# Patient Record
Sex: MALE | Race: BLACK OR AFRICAN AMERICAN | NOT HISPANIC OR LATINO | Employment: OTHER | URBAN - METROPOLITAN AREA
[De-identification: names, ages, dates, MRNs, and addresses within clinical notes are randomized per-mention and may not be internally consistent; named-entity substitution may affect disease eponyms.]

---

## 2017-05-31 ENCOUNTER — TELEPHONE (OUTPATIENT)
Dept: ORTHOPEDIC SURGERY | Facility: CLINIC | Age: 62
End: 2017-05-31

## 2017-06-01 NOTE — TELEPHONE ENCOUNTER
I can't make any guarantees about this.  I would be happy to see him in the next available I OV and could take further history with his x-rays and try and make that determination.

## 2017-06-20 ENCOUNTER — OFFICE VISIT (OUTPATIENT)
Dept: ORTHOPEDIC SURGERY | Facility: CLINIC | Age: 62
End: 2017-06-20

## 2017-06-20 VITALS — BODY MASS INDEX: 41.15 KG/M2 | WEIGHT: 262.2 LBS | HEIGHT: 67 IN | TEMPERATURE: 98.1 F

## 2017-06-20 DIAGNOSIS — Z96.653 H/O TOTAL KNEE REPLACEMENT, BILATERAL: ICD-10-CM

## 2017-06-20 DIAGNOSIS — Z96.653 HISTORY OF BILATERAL KNEE ARTHROPLASTY: Primary | ICD-10-CM

## 2017-06-20 PROCEDURE — 73562 X-RAY EXAM OF KNEE 3: CPT | Performed by: ORTHOPAEDIC SURGERY

## 2017-06-20 PROCEDURE — 99212 OFFICE O/P EST SF 10 MIN: CPT | Performed by: ORTHOPAEDIC SURGERY

## 2017-06-20 RX ORDER — ERGOCALCIFEROL 1.25 MG/1
50000 CAPSULE ORAL
COMMUNITY

## 2017-06-20 RX ORDER — INSULIN GLARGINE 100 [IU]/ML
85 INJECTION, SOLUTION SUBCUTANEOUS EVERY MORNING
COMMUNITY

## 2017-06-20 NOTE — PROGRESS NOTES
"Patient: Harsh Arias Jr.  YOB: 1955 61 y.o. male  Medical Record Number: [id number]    Chief Complaints:   Chief Complaint   Patient presents with   • Right Knee - Follow-up   • Left Knee - Follow-up       History of Present Illness:Harsh Arias Jr. is a 61 y.o. male who presents for follow-up of  Bilateral knees.  I replaced his left knee in 2014 and he's had the right [facility].  Overall her knees are satisfactory although has a little more discomfort in the right knee than in the left.  Unfortunately his weight his gone up a little bit more recently.    Allergies: No Known Allergies    Medications:   Current Outpatient Prescriptions   Medication Sig Dispense Refill   • Canagliflozin 100 MG tablet Take 100 mg by mouth.     • insulin glargine (LANTUS) 100 UNIT/ML injection Inject  under the skin.     • vitamin D (ERGOCALCIFEROL) 89854 UNITS capsule capsule Take 50,000 Units by mouth.       No current facility-administered medications for this visit.          The following portions of the patient's history were reviewed and updated as appropriate: allergies, current medications, past family history, past medical history, past social history, past surgical history and problem list.    Review of Systems:   A 14 point review of systems was performed. All systems negative except pertinent positives/negative listed in HPI above    Physical Exam:   Vitals:    06/20/17 1450   Temp: 98.1 °F (36.7 °C)   Weight: 262 lb 3.2 oz (119 kg)   Height: 67\" (170.2 cm)       General: A and O x 3, ASA, NAD    SCLERA:    Normal    DENTITION:   Normal  Both knees show well-healed incisions range of motion is roughly 0-120 bilaterally.  There is no effusion the patella tracks midline there's no varus valgus or flexion instability.    Radiology:  Xrays 3views of knees (ap,lateral, sunrise) were ordered and reviewed for evaluation of knee pain demonstrating well positioned knee " replacements without evidence of wear, loosening or osteolysis  todays xrays were compared to previous xrays and demonstrate no change    Assessment/Plan:  Bilateral total knee replacements both of them mechanically lock and field fine.  The x-rays show no issues.  He had a question about whether his right knee with service connected.  Clearly he had an left knee injury which may apply stress in the right knee but whether or not this is a service-connected diagnosis I would defer to [facility] who could review previous records if possible to determine if there any pre-existing right knee issues.

## 2018-01-24 ENCOUNTER — OFFICE VISIT (OUTPATIENT)
Dept: BARIATRICS/WEIGHT MGMT | Facility: CLINIC | Age: 63
End: 2018-01-24

## 2018-01-24 VITALS
TEMPERATURE: 98.7 F | SYSTOLIC BLOOD PRESSURE: 150 MMHG | RESPIRATION RATE: 18 BRPM | HEART RATE: 106 BPM | WEIGHT: 269 LBS | BODY MASS INDEX: 42.22 KG/M2 | HEIGHT: 67 IN | DIASTOLIC BLOOD PRESSURE: 94 MMHG

## 2018-01-24 DIAGNOSIS — R13.19 OTHER DYSPHAGIA: Primary | ICD-10-CM

## 2018-01-24 DIAGNOSIS — IMO0001 IDDM (INSULIN DEPENDENT DIABETES MELLITUS): ICD-10-CM

## 2018-01-24 DIAGNOSIS — K21.9 GASTROESOPHAGEAL REFLUX DISEASE, ESOPHAGITIS PRESENCE NOT SPECIFIED: ICD-10-CM

## 2018-01-24 DIAGNOSIS — R10.11 RUQ ABDOMINAL PAIN: ICD-10-CM

## 2018-01-24 DIAGNOSIS — Z98.84 HISTORY OF LAPAROSCOPIC ADJUSTABLE GASTRIC BANDING: ICD-10-CM

## 2018-01-24 PROCEDURE — 99204 OFFICE O/P NEW MOD 45 MIN: CPT | Performed by: SURGERY

## 2018-01-24 NOTE — PROGRESS NOTES
[facility] SURGERY  [address]  [place] [id number] [phone]  [address]  [place] [address] [phone]  Dept: [phone]  1/24/2018      Harsh Arias Jr..  [id number]  [id number]  1955  male      Chief Complaint   Patient presents with   • Consult     JOSY band - AGB 2014 with Dr. Castellon        Post-Op Bariatric Surgery:   Harsh Arias Jr. is status post laparopscopic Band procedure, performed on 2014 at [facility].    HPI:   Today's weight is 122 kg (269 lb) pounds, today's BMI is Body mass index is 42.12 kg/(m^2)..  his greatest weight loss from surgery was 70 pounds. The patient reports an unwanted weight gain of 69 pounds.  [unfilled] denies fever, chills, chest pain, SOA, melena, hematochezia, hematemesis, dysuria, frequency, hematuria, jaundice.    62-year-old gentleman status post realize band placement 2014 by Dr. Castellon with complaints of dysphagia with solid foods, heartburn, right upper quadrant abdominal pain at the port site with muscle spasms.  Patient states that he's had all of the fluid removed and still having complaints.  He was seen at the [facility] and then referred to us for LAP-BAND removal.  He states he has not had an upper GI in the past.      Diet and Exercise:   Diet history reviewed and discussed with the patient. Weight loss/gains to date discussed with the patient. He reports eating 2 meals per day, a typical portion size of 1 cup, eating 1 snacks per day, drinking 5 or more 8-oz. glasses of water per day, no carbonated beverage consumption and exercising regularly.     Supplements:  Vitamin D     Review of Systems   Constitutional: Positive for fatigue.   Respiratory: Positive for cough.    Gastrointestinal: Positive for abdominal pain, constipation and vomiting.   Musculoskeletal: Positive for arthralgias.   All other systems reviewed and are  negative.      Patient Active Problem List   Diagnosis   • BMI 40.0-44.9, adult   • IDDM (insulin dependent diabetes mellitus)   • Other dysphagia   • RUQ abdominal pain   • GERD (gastroesophageal reflux disease)   • History of laparoscopic adjustable gastric banding       Past Medical History:   Diagnosis Date   • IDDM (insulin dependent diabetes mellitus)    • Joint pain    • STEVE (obstructive sleep apnea)    • Weight gain        Past Surgical History:   Procedure Laterality Date   • KNEE SURGERY Right 2016   • KNEE SURGERY Left 2014   • LAPAROSCOPIC GASTRIC BANDING         No Known Allergies      Current Outpatient Prescriptions:   •  Canagliflozin 100 MG tablet, Take 100 mg by mouth., Disp: , Rfl:   •  insulin glargine (LANTUS) 100 UNIT/ML injection, Inject  under the skin., Disp: , Rfl:   •  vitamin D (ERGOCALCIFEROL) 23041 UNITS capsule capsule, Take 50,000 Units by mouth., Disp: , Rfl:     Social History     Social History   • Marital status:      Spouse name: N/A   • Number of children: N/A   • Years of education: N/A     Occupational History   • Not on file.     Social History Main Topics   • Smoking status: Former Smoker   • Smokeless tobacco: Never Used   • Alcohol use No   • Drug use: No   • Sexual activity: Not on file     Other Topics Concern   • Not on file     Social History Narrative       Family History   Problem Relation Age of Onset   • No Known Problems Mother    • No Known Problems Father    • No Known Problems Sister    • No Known Problems Brother    • No Known Problems Maternal Grandmother    • No Known Problems Maternal Grandfather    • No Known Problems Paternal Grandmother    • No Known Problems Paternal Grandfather        The following portions of the patient's history were reviewed and updated as appropriate: allergies, current medications, past family history, past medical history, past social history, past surgical history and problem list.    Vitals:    01/24/18 1144   BP:  150/94   Pulse: 106   Resp: 18   Temp: 98.7 °F (37.1 °C)       Physical Exam   Constitutional: He is oriented to person, place, and time. He appears well-nourished.   HENT:   Head: Normocephalic and atraumatic.   Mouth/Throat: Oropharynx is clear and moist.   Eyes: Conjunctivae and EOM are normal. Pupils are equal, round, and reactive to light. No scleral icterus.   Neck: Normal range of motion. Neck supple. No thyromegaly present.   Cardiovascular: Normal rate and regular rhythm.    Pulmonary/Chest: Effort normal and breath sounds normal.   Abdominal: Soft. Bowel sounds are normal. He exhibits no distension. There is no tenderness. There is no rebound and no guarding. No hernia.   Tenderness to palpation right upper quadrant at the port site   Musculoskeletal: Normal range of motion.   Lymphadenopathy:     He has no cervical adenopathy.   Neurological: He is alert and oriented to person, place, and time. No cranial nerve deficit. Coordination normal.   Skin: Skin is warm and dry. No erythema.   Psychiatric: He has a normal mood and affect. His behavior is normal.   Vitals reviewed.          Assessment:   Harsh Arias Jr. has severe obesity with multiple co-morbidities who would like to transfer his bariatric care to us and participate in our bariatric program.      Encounter Diagnoses   Name Primary?   • Other dysphagia Yes   • RUQ abdominal pain    • Gastroesophageal reflux disease, esophagitis presence not specified    • BMI 40.0-44.9, adult    • IDDM (insulin dependent diabetes mellitus)    • History of laparoscopic adjustable gastric banding          Discussion/Summary/Plan:     62-year-old gentleman status post realize band placement with chronic dysphagia heartburn port site pain who was referred from the [facility] to have the band removed.  We will proceed with an upper GI for further evaluation to rule out of erosion or slip.The risks and benefits of the procedure were discussed with the patient in  detail and all questions were answered.  Possibility of open, bleeding, infection, bowel injury, deep venous thrombosis, pulmonary embolism, incisional hernias, renal failure, myocardial infarction, respiratory and cardiac arrest and death were discussed. Consent will be signed and witnessed.    Recommended patient be sure to eat at least three meals per day all with high lean protein, vegetables and fruit. Be sure to limit/cut back on daily simple carbohydrate intake. Discussed with the patient the recommended amount of water per day to intake. Reviewed vitamin requirements. Be sure to do routine exercise including both cardio and strength training. Recommended patient to see our dietician to go into more detail regarding diet changes. Also discussed BMR testing.    Instructions / Recommendations: dietary counseling recommended, recommended a daily protein intake of  grams, vitamin supplements recommended, recommended exercising at least 150 minutes per week, behavior modifications recommended and instructed to call the office for concerns, questions, or problems.    The patient was instructed to follow up in after surgery    The patient was counseled regarding diet, exercise and the surgical procedures available. Our bariatric manual was given to the patient and was discussed in detail. Dietician appointment was highly recommended as well as attending support groups.  Total time of encounter was over 45 minutes and 40 minutes was spent counseling.

## 2018-02-14 ENCOUNTER — HOSPITAL ENCOUNTER (OUTPATIENT)
Dept: GENERAL RADIOLOGY | Facility: HOSPITAL | Age: 63
Discharge: HOME | End: 2018-02-14
Attending: SURGERY | Admitting: SURGERY

## 2018-02-14 PROCEDURE — 74241: CPT

## 2018-02-15 ENCOUNTER — TELEPHONE (OUTPATIENT)
Dept: BARIATRICS/WEIGHT MGMT | Facility: CLINIC | Age: 63
End: 2018-02-15

## 2018-02-15 NOTE — TELEPHONE ENCOUNTER
----- Message from Jacob Leiva Jr., MD sent at 2/15/2018 12:24 PM EST -----  I left patient a voicemail regarding his upper GI results.  Everything looked okay to proceed with surgery.

## 2018-02-18 ENCOUNTER — PREP FOR SURGERY (OUTPATIENT)
Dept: OTHER | Facility: HOSPITAL | Age: 63
End: 2018-02-18

## 2018-02-18 DIAGNOSIS — R13.10 DYSPHAGIA, UNSPECIFIED TYPE: Primary | ICD-10-CM

## 2018-02-18 RX ORDER — CEFAZOLIN SODIUM IN 0.9 % NACL 3 G/100 ML
3 INTRAVENOUS SOLUTION, PIGGYBACK (ML) INTRAVENOUS
Status: CANCELLED | OUTPATIENT
Start: 2018-03-14

## 2018-02-18 RX ORDER — METOCLOPRAMIDE HYDROCHLORIDE 5 MG/ML
10 INJECTION INTRAMUSCULAR; INTRAVENOUS ONCE
Status: CANCELLED | OUTPATIENT
Start: 2018-03-14 | End: 2018-03-14

## 2018-02-18 RX ORDER — SODIUM CHLORIDE 0.9 % (FLUSH) 0.9 %
1-10 SYRINGE (ML) INJECTION AS NEEDED
Status: CANCELLED | OUTPATIENT
Start: 2018-03-14

## 2018-02-18 RX ORDER — CHLORHEXIDINE GLUCONATE 0.12 MG/ML
15 RINSE ORAL
Status: CANCELLED | OUTPATIENT
Start: 2018-03-14 | End: 2018-03-14

## 2018-02-18 RX ORDER — SODIUM CHLORIDE, SODIUM LACTATE, POTASSIUM CHLORIDE, CALCIUM CHLORIDE 600; 310; 30; 20 MG/100ML; MG/100ML; MG/100ML; MG/100ML
100 INJECTION, SOLUTION INTRAVENOUS CONTINUOUS
Status: CANCELLED | OUTPATIENT
Start: 2018-03-14

## 2018-02-18 RX ORDER — ACETAMINOPHEN 160 MG/5ML
975 SOLUTION ORAL ONCE
Status: CANCELLED | OUTPATIENT
Start: 2018-03-14 | End: 2018-03-14

## 2018-02-18 RX ORDER — FAMOTIDINE 10 MG/ML
20 INJECTION, SOLUTION INTRAVENOUS ONCE
Status: CANCELLED | OUTPATIENT
Start: 2018-03-14 | End: 2018-03-14

## 2018-02-18 RX ORDER — SCOLOPAMINE TRANSDERMAL SYSTEM 1 MG/1
1 PATCH, EXTENDED RELEASE TRANSDERMAL ONCE
Status: CANCELLED | OUTPATIENT
Start: 2018-03-14 | End: 2018-03-14

## 2018-02-19 PROBLEM — R13.10 DYSPHAGIA: Status: ACTIVE | Noted: 2018-02-19

## 2018-02-26 ENCOUNTER — APPOINTMENT (OUTPATIENT)
Dept: PREADMISSION TESTING | Facility: HOSPITAL | Age: 63
End: 2018-02-26

## 2018-02-26 VITALS
RESPIRATION RATE: 18 BRPM | HEIGHT: 67 IN | SYSTOLIC BLOOD PRESSURE: 138 MMHG | OXYGEN SATURATION: 96 % | HEART RATE: 102 BPM | TEMPERATURE: 98.3 F | DIASTOLIC BLOOD PRESSURE: 73 MMHG

## 2018-02-26 DIAGNOSIS — R13.10 DYSPHAGIA, UNSPECIFIED TYPE: ICD-10-CM

## 2018-02-26 LAB
ALBUMIN SERPL-MCNC: 4.1 G/DL (ref 3.5–5.2)
ALBUMIN/GLOB SERPL: 1.2 G/DL
ALP SERPL-CCNC: 60 U/L (ref 39–117)
ALT SERPL W P-5'-P-CCNC: 32 U/L (ref 1–41)
ANION GAP SERPL CALCULATED.3IONS-SCNC: 14.4 MMOL/L
AST SERPL-CCNC: 35 U/L (ref 1–40)
BILIRUB SERPL-MCNC: 0.4 MG/DL (ref 0.1–1.2)
BUN BLD-MCNC: 14 MG/DL (ref 8–23)
BUN/CREAT SERPL: 14.3 (ref 7–25)
CALCIUM SPEC-SCNC: 9.5 MG/DL (ref 8.6–10.5)
CHLORIDE SERPL-SCNC: 96 MMOL/L (ref 98–107)
CO2 SERPL-SCNC: 25.6 MMOL/L (ref 22–29)
CREAT BLD-MCNC: 0.98 MG/DL (ref 0.76–1.27)
DEPRECATED RDW RBC AUTO: 42.5 FL (ref 37–54)
ERYTHROCYTE [DISTWIDTH] IN BLOOD BY AUTOMATED COUNT: 11.9 % (ref 11.5–14.5)
GFR SERPL CREATININE-BSD FRML MDRD: 94 ML/MIN/1.73
GLOBULIN UR ELPH-MCNC: 3.4 GM/DL
GLUCOSE BLD-MCNC: 303 MG/DL (ref 65–99)
HCT VFR BLD AUTO: 43.6 % (ref 40.4–52.2)
HGB BLD-MCNC: 13.4 G/DL (ref 13.7–17.6)
MCH RBC QN AUTO: 29.9 PG (ref 27–32.7)
MCHC RBC AUTO-ENTMCNC: 30.7 G/DL (ref 32.6–36.4)
MCV RBC AUTO: 97.3 FL (ref 79.8–96.2)
PLATELET # BLD AUTO: 256 10*3/MM3 (ref 140–500)
PMV BLD AUTO: 12.1 FL (ref 6–12)
POTASSIUM BLD-SCNC: 4.4 MMOL/L (ref 3.5–5.2)
PROT SERPL-MCNC: 7.5 G/DL (ref 6–8.5)
RBC # BLD AUTO: 4.48 10*6/MM3 (ref 4.6–6)
SODIUM BLD-SCNC: 136 MMOL/L (ref 136–145)
WBC NRBC COR # BLD: 12.04 10*3/MM3 (ref 4.5–10.7)

## 2018-02-26 PROCEDURE — 36415 COLL VENOUS BLD VENIPUNCTURE: CPT

## 2018-02-26 PROCEDURE — 85027 COMPLETE CBC AUTOMATED: CPT | Performed by: SURGERY

## 2018-02-26 PROCEDURE — 93005 ELECTROCARDIOGRAM TRACING: CPT

## 2018-02-26 PROCEDURE — 80053 COMPREHEN METABOLIC PANEL: CPT | Performed by: SURGERY

## 2018-02-26 PROCEDURE — 93010 ELECTROCARDIOGRAM REPORT: CPT | Performed by: INTERNAL MEDICINE

## 2018-02-26 RX ORDER — FOSINOPRIL SODIUM 10 MG/1
5 TABLET ORAL EVERY MORNING
COMMUNITY

## 2018-02-26 RX ORDER — INSULIN GLARGINE 100 [IU]/ML
58 INJECTION, SOLUTION SUBCUTANEOUS EVERY EVENING
COMMUNITY

## 2018-02-26 RX ORDER — VARDENAFIL HYDROCHLORIDE 20 MG/1
20 TABLET ORAL DAILY PRN
COMMUNITY

## 2018-02-26 NOTE — DISCHARGE INSTRUCTIONS
Take the following medications the morning of surgery with a small sip of water:    METOPROLOL.    ARRIVE AT 10:30    General Instructions:   • You may have up to 20 oz of clear-artificially sweetened liquid (to include Powerade Zero, Water, Tea/Coffee with no cream or milk added).  Nothing red in color.  Drink must be completed 4 hours before the start of your surgery- nothing to drink within 4 hours of surgery.    • Patients who avoid smoking, chewing tobacco and alcohol for 4 weeks prior to surgery have a reduced risk of post-operative complications.  Quit smoking as many days before surgery as you can.  • Do not smoke, use chewing tobacco or drink alcohol the day of surgery.   • If applicable bring your C-PAP/ BI-PAP machine.  • Bring any papers given to you in the doctor’s office.  • Wear clean comfortable clothes and socks.  • Do not wear contact lenses or make-up.  Bring a case for your glasses.   • Bring crutches or walker if applicable.  • Remove all piercings.  Leave jewelry and any other valuables at home.  • Hair extensions with metal clips must be removed prior to surgery.  • The Pre-Admission Testing nurse will instruct you to bring medications if unable to obtain an accurate list in Pre-Admission Testing.        If you were given a blood bank ID arm band remember to bring it with you the day of surgery.    Preventing a Surgical Site Infection:  • For 2 to 3 days before surgery, avoid shaving with a razor because the razor can irritate skin and make it easier to develop an infection.  • The night prior to surgery sleep in a clean bed with clean clothing.  Do not allow pets to sleep with you.  • Shower on the morning of surgery using a fresh bar of anti-bacterial soap (such as Dial) and clean washcloth.  Dry with a clean towel and dress in clean clothing.  • Ask your surgeon if you will be receiving antibiotics prior to surgery.  • Make sure you, your family, and all healthcare providers clean their  hands with soap and water or an alcohol based hand  before caring for you or your wound.    Day of surgery:  Upon arrival, a Pre-op nurse and Anesthesiologist will review your health history, obtain vital signs, and answer questions you may have.  The only belongings needed at this time will be your home medications and if applicable your C-PAP/BI-PAP machine.  If you are staying overnight your family can leave the rest of your belongings in the car and bring them to your room later.  A Pre-op nurse will start an IV and you may receive medication in preparation for surgery, including something to help you relax.  Your family will be able to see you in the Pre-op area.  While you are in surgery your family should notify the waiting room  if they leave the waiting room area and provide a contact phone number.    Please be aware that surgery does come with discomfort.  We want to make every effort to control your discomfort so please discuss any uncontrolled symptoms with your nurse.   Your doctor will most likely have prescribed pain medications.      If you are going home after surgery you will receive individualized written care instructions before being discharged.  A responsible adult must drive you to and from the hospital on the day of your surgery and stay with you for 24 hours.    If you are staying overnight following surgery, you will be transported to your hospital room following the recovery period.  [facility] has all private rooms.    If you have any questions please call Pre-Admission Testing at [phone].  Deductibles and co-payments are collected on the day of service. Please be prepared to pay the required co-pay, deductible or deposit on the day of service as defined by your plan.

## 2018-03-14 ENCOUNTER — HOSPITAL ENCOUNTER (OUTPATIENT)
Facility: HOSPITAL | Age: 63
Setting detail: HOSPITAL OUTPATIENT SURGERY
Discharge: HOME | End: 2018-03-14
Attending: SURGERY | Admitting: SURGERY

## 2018-03-14 ENCOUNTER — ANESTHESIA EVENT (OUTPATIENT)
Dept: PERIOP | Facility: HOSPITAL | Age: 63
End: 2018-03-14

## 2018-03-14 ENCOUNTER — ANESTHESIA (OUTPATIENT)
Dept: PERIOP | Facility: HOSPITAL | Age: 63
End: 2018-03-14

## 2018-03-14 VITALS
SYSTOLIC BLOOD PRESSURE: 144 MMHG | HEART RATE: 97 BPM | OXYGEN SATURATION: 93 % | TEMPERATURE: 98 F | WEIGHT: 271.06 LBS | BODY MASS INDEX: 42.45 KG/M2 | RESPIRATION RATE: 20 BRPM | DIASTOLIC BLOOD PRESSURE: 83 MMHG

## 2018-03-14 DIAGNOSIS — R13.10 DYSPHAGIA, UNSPECIFIED TYPE: ICD-10-CM

## 2018-03-14 LAB
GLUCOSE BLDC GLUCOMTR-MCNC: 161 MG/DL (ref 70–130)
GLUCOSE BLDC GLUCOMTR-MCNC: 205 MG/DL (ref 70–130)

## 2018-03-14 PROCEDURE — 25010000002 FENTANYL CITRATE (PF) 100 MCG/2ML SOLUTION: Performed by: NURSE ANESTHETIST, CERTIFIED REGISTERED

## 2018-03-14 PROCEDURE — 25010000002 ONDANSETRON PER 1 MG: Performed by: NURSE ANESTHETIST, CERTIFIED REGISTERED

## 2018-03-14 PROCEDURE — 82962 GLUCOSE BLOOD TEST: CPT

## 2018-03-14 PROCEDURE — 25010000002 MIDAZOLAM PER 1 MG: Performed by: ANESTHESIOLOGY

## 2018-03-14 PROCEDURE — 25010000002 METOCLOPRAMIDE PER 10 MG: Performed by: SURGERY

## 2018-03-14 PROCEDURE — 43774 LAP RMVL GASTR ADJ ALL PARTS: CPT | Performed by: SURGERY

## 2018-03-14 PROCEDURE — 25010000002 PROPOFOL 10 MG/ML EMULSION: Performed by: NURSE ANESTHETIST, CERTIFIED REGISTERED

## 2018-03-14 PROCEDURE — 43774 LAP RMVL GASTR ADJ ALL PARTS: CPT | Performed by: NURSE PRACTITIONER

## 2018-03-14 PROCEDURE — 25010000002 KETOROLAC TROMETHAMINE PER 15 MG: Performed by: NURSE ANESTHETIST, CERTIFIED REGISTERED

## 2018-03-14 RX ORDER — CHLORHEXIDINE GLUCONATE 0.12 MG/ML
15 RINSE ORAL
Status: COMPLETED | OUTPATIENT
Start: 2018-03-14 | End: 2018-03-14

## 2018-03-14 RX ORDER — PROMETHAZINE HYDROCHLORIDE 25 MG/1
25 SUPPOSITORY RECTAL ONCE AS NEEDED
Status: DISCONTINUED | OUTPATIENT
Start: 2018-03-14 | End: 2018-03-14 | Stop reason: HOSPADM

## 2018-03-14 RX ORDER — ONDANSETRON 2 MG/ML
4 INJECTION INTRAMUSCULAR; INTRAVENOUS ONCE AS NEEDED
Status: DISCONTINUED | OUTPATIENT
Start: 2018-03-14 | End: 2018-03-14 | Stop reason: HOSPADM

## 2018-03-14 RX ORDER — PROMETHAZINE HYDROCHLORIDE 25 MG/ML
12.5 INJECTION, SOLUTION INTRAMUSCULAR; INTRAVENOUS ONCE AS NEEDED
Status: DISCONTINUED | OUTPATIENT
Start: 2018-03-14 | End: 2018-03-14 | Stop reason: HOSPADM

## 2018-03-14 RX ORDER — SODIUM CHLORIDE, SODIUM LACTATE, POTASSIUM CHLORIDE, CALCIUM CHLORIDE 600; 310; 30; 20 MG/100ML; MG/100ML; MG/100ML; MG/100ML
100 INJECTION, SOLUTION INTRAVENOUS CONTINUOUS
Status: DISCONTINUED | OUTPATIENT
Start: 2018-03-14 | End: 2018-03-14 | Stop reason: HOSPADM

## 2018-03-14 RX ORDER — DIPHENHYDRAMINE HYDROCHLORIDE 50 MG/ML
12.5 INJECTION INTRAMUSCULAR; INTRAVENOUS
Status: DISCONTINUED | OUTPATIENT
Start: 2018-03-14 | End: 2018-03-14 | Stop reason: HOSPADM

## 2018-03-14 RX ORDER — FLUMAZENIL 0.1 MG/ML
0.2 INJECTION INTRAVENOUS AS NEEDED
Status: DISCONTINUED | OUTPATIENT
Start: 2018-03-14 | End: 2018-03-14 | Stop reason: HOSPADM

## 2018-03-14 RX ORDER — CEFAZOLIN SODIUM IN 0.9 % NACL 3 G/100 ML
3 INTRAVENOUS SOLUTION, PIGGYBACK (ML) INTRAVENOUS
Status: COMPLETED | OUTPATIENT
Start: 2018-03-14 | End: 2018-03-14

## 2018-03-14 RX ORDER — METOCLOPRAMIDE HYDROCHLORIDE 5 MG/ML
10 INJECTION INTRAMUSCULAR; INTRAVENOUS ONCE
Status: COMPLETED | OUTPATIENT
Start: 2018-03-14 | End: 2018-03-14

## 2018-03-14 RX ORDER — EPHEDRINE SULFATE 50 MG/ML
5 INJECTION, SOLUTION INTRAVENOUS ONCE AS NEEDED
Status: DISCONTINUED | OUTPATIENT
Start: 2018-03-14 | End: 2018-03-14 | Stop reason: HOSPADM

## 2018-03-14 RX ORDER — SCOLOPAMINE TRANSDERMAL SYSTEM 1 MG/1
1 PATCH, EXTENDED RELEASE TRANSDERMAL ONCE
Status: DISCONTINUED | OUTPATIENT
Start: 2018-03-14 | End: 2018-03-14 | Stop reason: HOSPADM

## 2018-03-14 RX ORDER — SODIUM CHLORIDE 0.9 % (FLUSH) 0.9 %
1-10 SYRINGE (ML) INJECTION AS NEEDED
Status: DISCONTINUED | OUTPATIENT
Start: 2018-03-14 | End: 2018-03-14 | Stop reason: HOSPADM

## 2018-03-14 RX ORDER — BUPIVACAINE HYDROCHLORIDE AND EPINEPHRINE 5; 5 MG/ML; UG/ML
INJECTION, SOLUTION EPIDURAL; INTRACAUDAL; PERINEURAL AS NEEDED
Status: DISCONTINUED | OUTPATIENT
Start: 2018-03-14 | End: 2018-03-14 | Stop reason: HOSPADM

## 2018-03-14 RX ORDER — FENTANYL CITRATE 50 UG/ML
INJECTION, SOLUTION INTRAMUSCULAR; INTRAVENOUS AS NEEDED
Status: DISCONTINUED | OUTPATIENT
Start: 2018-03-14 | End: 2018-03-14 | Stop reason: SURG

## 2018-03-14 RX ORDER — HYDRALAZINE HYDROCHLORIDE 20 MG/ML
5 INJECTION INTRAMUSCULAR; INTRAVENOUS
Status: DISCONTINUED | OUTPATIENT
Start: 2018-03-14 | End: 2018-03-14 | Stop reason: HOSPADM

## 2018-03-14 RX ORDER — HYDROCODONE BITARTRATE AND ACETAMINOPHEN 7.5; 325 MG/1; MG/1
1 TABLET ORAL ONCE AS NEEDED
Status: DISCONTINUED | OUTPATIENT
Start: 2018-03-14 | End: 2018-03-14 | Stop reason: HOSPADM

## 2018-03-14 RX ORDER — PROMETHAZINE HYDROCHLORIDE 25 MG/1
25 TABLET ORAL ONCE AS NEEDED
Status: DISCONTINUED | OUTPATIENT
Start: 2018-03-14 | End: 2018-03-14 | Stop reason: HOSPADM

## 2018-03-14 RX ORDER — NALOXONE HCL 0.4 MG/ML
0.2 VIAL (ML) INJECTION AS NEEDED
Status: DISCONTINUED | OUTPATIENT
Start: 2018-03-14 | End: 2018-03-14 | Stop reason: HOSPADM

## 2018-03-14 RX ORDER — MIDAZOLAM HYDROCHLORIDE 1 MG/ML
2 INJECTION INTRAMUSCULAR; INTRAVENOUS
Status: DISCONTINUED | OUTPATIENT
Start: 2018-03-14 | End: 2018-03-14 | Stop reason: HOSPADM

## 2018-03-14 RX ORDER — PROPOFOL 10 MG/ML
VIAL (ML) INTRAVENOUS AS NEEDED
Status: DISCONTINUED | OUTPATIENT
Start: 2018-03-14 | End: 2018-03-14 | Stop reason: SURG

## 2018-03-14 RX ORDER — FAMOTIDINE 10 MG/ML
20 INJECTION, SOLUTION INTRAVENOUS ONCE
Status: COMPLETED | OUTPATIENT
Start: 2018-03-14 | End: 2018-03-14

## 2018-03-14 RX ORDER — MIDAZOLAM HYDROCHLORIDE 1 MG/ML
1 INJECTION INTRAMUSCULAR; INTRAVENOUS
Status: DISCONTINUED | OUTPATIENT
Start: 2018-03-14 | End: 2018-03-14 | Stop reason: HOSPADM

## 2018-03-14 RX ORDER — KETOROLAC TROMETHAMINE 30 MG/ML
INJECTION, SOLUTION INTRAMUSCULAR; INTRAVENOUS AS NEEDED
Status: DISCONTINUED | OUTPATIENT
Start: 2018-03-14 | End: 2018-03-14 | Stop reason: SURG

## 2018-03-14 RX ORDER — SODIUM CHLORIDE, SODIUM LACTATE, POTASSIUM CHLORIDE, CALCIUM CHLORIDE 600; 310; 30; 20 MG/100ML; MG/100ML; MG/100ML; MG/100ML
9 INJECTION, SOLUTION INTRAVENOUS CONTINUOUS
Status: DISCONTINUED | OUTPATIENT
Start: 2018-03-14 | End: 2018-03-14 | Stop reason: HOSPADM

## 2018-03-14 RX ORDER — ROCURONIUM BROMIDE 10 MG/ML
INJECTION, SOLUTION INTRAVENOUS AS NEEDED
Status: DISCONTINUED | OUTPATIENT
Start: 2018-03-14 | End: 2018-03-14 | Stop reason: SURG

## 2018-03-14 RX ORDER — FENTANYL CITRATE 50 UG/ML
50 INJECTION, SOLUTION INTRAMUSCULAR; INTRAVENOUS
Status: DISCONTINUED | OUTPATIENT
Start: 2018-03-14 | End: 2018-03-14 | Stop reason: HOSPADM

## 2018-03-14 RX ORDER — ROCURONIUM BROMIDE 10 MG/ML
INJECTION, SOLUTION INTRAVENOUS AS NEEDED
Status: DISCONTINUED | OUTPATIENT
Start: 2018-03-14 | End: 2018-03-14

## 2018-03-14 RX ORDER — LIDOCAINE HYDROCHLORIDE 10 MG/ML
0.5 INJECTION, SOLUTION EPIDURAL; INFILTRATION; INTRACAUDAL; PERINEURAL ONCE AS NEEDED
Status: DISCONTINUED | OUTPATIENT
Start: 2018-03-14 | End: 2018-03-14 | Stop reason: HOSPADM

## 2018-03-14 RX ORDER — OXYCODONE AND ACETAMINOPHEN 7.5; 325 MG/1; MG/1
1 TABLET ORAL ONCE AS NEEDED
Status: DISCONTINUED | OUTPATIENT
Start: 2018-03-14 | End: 2018-03-14 | Stop reason: HOSPADM

## 2018-03-14 RX ORDER — PROMETHAZINE HYDROCHLORIDE 25 MG/1
12.5 TABLET ORAL ONCE AS NEEDED
Status: DISCONTINUED | OUTPATIENT
Start: 2018-03-14 | End: 2018-03-14 | Stop reason: HOSPADM

## 2018-03-14 RX ORDER — LABETALOL HYDROCHLORIDE 5 MG/ML
5 INJECTION, SOLUTION INTRAVENOUS
Status: DISCONTINUED | OUTPATIENT
Start: 2018-03-14 | End: 2018-03-14 | Stop reason: HOSPADM

## 2018-03-14 RX ORDER — ACETAMINOPHEN 160 MG/5ML
975 SOLUTION ORAL ONCE
Status: COMPLETED | OUTPATIENT
Start: 2018-03-14 | End: 2018-03-14

## 2018-03-14 RX ORDER — ONDANSETRON 2 MG/ML
INJECTION INTRAMUSCULAR; INTRAVENOUS AS NEEDED
Status: DISCONTINUED | OUTPATIENT
Start: 2018-03-14 | End: 2018-03-14 | Stop reason: SURG

## 2018-03-14 RX ADMIN — ACETAMINOPHEN 975 MG: 325 SOLUTION ORAL at 11:38

## 2018-03-14 RX ADMIN — CHLORHEXIDINE GLUCONATE 15 ML: 1.2 RINSE ORAL at 11:44

## 2018-03-14 RX ADMIN — SCOPALAMINE 1 PATCH: 1 PATCH, EXTENDED RELEASE TRANSDERMAL at 11:41

## 2018-03-14 RX ADMIN — SODIUM CHLORIDE, POTASSIUM CHLORIDE, SODIUM LACTATE AND CALCIUM CHLORIDE: 600; 310; 30; 20 INJECTION, SOLUTION INTRAVENOUS at 12:35

## 2018-03-14 RX ADMIN — SODIUM CHLORIDE, POTASSIUM CHLORIDE, SODIUM LACTATE AND CALCIUM CHLORIDE 1000 ML: 600; 310; 30; 20 INJECTION, SOLUTION INTRAVENOUS at 11:35

## 2018-03-14 RX ADMIN — HYDROCODONE BITARTRATE AND ACETAMINOPHEN 15 ML: 7.5; 325 SOLUTION ORAL at 14:51

## 2018-03-14 RX ADMIN — Medication 15 ML: at 14:51

## 2018-03-14 RX ADMIN — SUGAMMADEX 500 MG: 100 INJECTION, SOLUTION INTRAVENOUS at 14:14

## 2018-03-14 RX ADMIN — CEFAZOLIN 3 G: 1 INJECTION, POWDER, FOR SOLUTION INTRAMUSCULAR; INTRAVENOUS; PARENTERAL at 13:30

## 2018-03-14 RX ADMIN — PROPOFOL 200 MG: 10 INJECTION, EMULSION INTRAVENOUS at 13:25

## 2018-03-14 RX ADMIN — ONDANSETRON 4 MG: 2 INJECTION INTRAMUSCULAR; INTRAVENOUS at 13:42

## 2018-03-14 RX ADMIN — KETOROLAC TROMETHAMINE 30 MG: 30 INJECTION, SOLUTION INTRAMUSCULAR; INTRAVENOUS at 14:15

## 2018-03-14 RX ADMIN — METOCLOPRAMIDE 10 MG: 5 INJECTION, SOLUTION INTRAMUSCULAR; INTRAVENOUS at 11:45

## 2018-03-14 RX ADMIN — FENTANYL CITRATE 100 MCG: 50 INJECTION INTRAMUSCULAR; INTRAVENOUS at 13:25

## 2018-03-14 RX ADMIN — ROCURONIUM BROMIDE 20 MG: 10 INJECTION INTRAVENOUS at 13:51

## 2018-03-14 RX ADMIN — FENTANYL CITRATE 50 MCG: 50 INJECTION INTRAMUSCULAR; INTRAVENOUS at 13:55

## 2018-03-14 RX ADMIN — MIDAZOLAM 2 MG: 1 INJECTION INTRAMUSCULAR; INTRAVENOUS at 11:44

## 2018-03-14 RX ADMIN — SODIUM CHLORIDE, POTASSIUM CHLORIDE, SODIUM LACTATE AND CALCIUM CHLORIDE: 600; 310; 30; 20 INJECTION, SOLUTION INTRAVENOUS at 13:40

## 2018-03-14 RX ADMIN — FAMOTIDINE 20 MG: 10 INJECTION INTRAVENOUS at 11:42

## 2018-03-14 RX ADMIN — ROCURONIUM BROMIDE 10 MG: 10 INJECTION INTRAVENOUS at 13:25

## 2018-03-14 RX ADMIN — ROCURONIUM BROMIDE 20 MG: 10 INJECTION INTRAVENOUS at 13:31

## 2018-03-14 NOTE — ANESTHESIA POSTPROCEDURE EVALUATION
Patient: Harsh Arias Jr.    Procedure Summary     Date:  03/14/18 Room / Location:   [facility]  [facility]    Anesthesia Start:  1322 Anesthesia Stop:  1432    Procedure:  GASTRIC BANDING REMOVAL LAPAROSCOPIC, LYSIS OF ADHESIONS (N/A Abdomen) Diagnosis:       Dysphagia, unspecified type      (Dysphagia, unspecified type [R13.10])    Surgeon:  Jacob Leiva Jr., MD Provider:  Kristopher Clayton MD    Anesthesia Type:  general ASA Status:  3          Anesthesia Type: general  Last vitals  BP   146/87 (03/14/18 1500)   Temp   36.7 °C (98 °F) (03/14/18 1428)   Pulse   94 (03/14/18 1500)   Resp   20 (03/14/18 1500)     SpO2   96 % (03/14/18 1500)     Post Anesthesia Care and Evaluation    Patient location during evaluation: PACU  Patient participation: complete - patient participated  Level of consciousness: awake and alert  Pain management: adequate  Airway patency: patent  Anesthetic complications: No anesthetic complications    Cardiovascular status: acceptable  Respiratory status: acceptable  Hydration status: acceptable    Comments: --------------------            03/14/18               1500     --------------------   BP:       146/87     Pulse:      94       Resp:       20       Temp:                SpO2:      96%      --------------------

## 2018-03-14 NOTE — ANESTHESIA PROCEDURE NOTES
Airway  Urgency: elective    Airway not difficult    General Information and Staff    Patient location during procedure: OR  Anesthesiologist: MAX COMBS  CRNA: CAS BURTON    Indications and Patient Condition  Indications for airway management: airway protection    Preoxygenated: yes  MILS not maintained throughout  Mask difficulty assessment: 1 - vent by mask    Final Airway Details  Final airway type: endotracheal airway      Successful airway: ETT  Cuffed: yes   Successful intubation technique: direct laryngoscopy  Facilitating devices/methods: cricoid pressure  Endotracheal tube insertion site: oral  Blade: Herman  ETT size: 7.5 mm  Cormack-Lehane Classification: grade IIa - partial view of glottis  Placement verified by: chest auscultation and capnometry   Measured from: lips  ETT to lips (cm): 23  Number of attempts at approach: 1    Additional Comments  Dentition intact and unchanged. CBEBS.  +ETCO2.

## 2018-03-14 NOTE — ANESTHESIA PREPROCEDURE EVALUATION
Anesthesia Evaluation     NPO Solid Status: > 8 hours  NPO Liquid Status: > 4 hours           Airway   Mallampati: II  TM distance: >3 FB  Neck ROM: full  Possible difficult intubation and Large neck circumference  Dental - normal exam     Pulmonary - normal exam   (+) asthma, sleep apnea on CPAP,   Cardiovascular - normal exam    Rhythm: regular  Rate: normal    (+) hypertension,       Neuro/Psych  GI/Hepatic/Renal/Endo    (+) morbid obesity, GERD,  diabetes mellitus well controlled using insulin,     Musculoskeletal     Abdominal  - normal exam   Substance History      OB/GYN          Other                        Anesthesia Plan    ASA 3     general     intravenous induction   Anesthetic plan and risks discussed with patient.

## 2018-03-14 NOTE — H&P
[facility] BARIATRIC SURGERY  [address]  [facility] [id number] [phone]  [address]  [facility] [address] [phone]  Dept: [phone]  1/24/2018        Harsh Arias Jr..  [id number]  [id number]  1955  male             Chief Complaint   Patient presents with   • Consult       JOSY band - AGB 2014 with Dr. Castellon          Post-Op Bariatric Surgery:   Harsh Arias Jr. is status post laparopscopic Band procedure, performed on 2014 at [facility].     HPI:   Today's weight is 122 kg (269 lb) pounds, today's BMI is Body mass index is 42.12 kg/(m^2)..  his greatest weight loss from surgery was 70 pounds. The patient reports an unwanted weight gain of 69 pounds.  [unfilled] denies fever, chills, chest pain, SOA, melena, hematochezia, hematemesis, dysuria, frequency, hematuria, jaundice.     62-year-old gentleman status post realize band placement 2014 by Dr. Castellon with complaints of dysphagia with solid foods, heartburn, right upper quadrant abdominal pain at the port site with muscle spasms.  Patient states that he's had all of the fluid removed and still having complaints.  He was seen at the [facility] and then referred to us for LAP-BAND removal.  He states he has not had an upper GI in the past.        Diet and Exercise:   Diet history reviewed and discussed with the patient. Weight loss/gains to date discussed with the patient. He reports eating 2 meals per day, a typical portion size of 1 cup, eating 1 snacks per day, drinking 5 or more 8-oz. glasses of water per day, no carbonated beverage consumption and exercising regularly.      Supplements:  Vitamin D      Review of Systems   Constitutional: Positive for fatigue.   Respiratory: Positive for cough.    Gastrointestinal: Positive for abdominal pain, constipation and vomiting.   Musculoskeletal: Positive for arthralgias.   All other systems reviewed and  are negative.            Patient Active Problem List   Diagnosis   • BMI 40.0-44.9, adult   • IDDM (insulin dependent diabetes mellitus)   • Other dysphagia   • RUQ abdominal pain   • GERD (gastroesophageal reflux disease)   • History of laparoscopic adjustable gastric banding         Medical History   Past Medical History:   Diagnosis Date   • IDDM (insulin dependent diabetes mellitus)     • Joint pain     • STEVE (obstructive sleep apnea)     • Weight gain              Surgical History         Past Surgical History:   Procedure Laterality Date   • KNEE SURGERY Right 2016   • KNEE SURGERY Left 2014   • LAPAROSCOPIC GASTRIC BANDING                No Known Allergies        Current Outpatient Prescriptions:   •  Canagliflozin 100 MG tablet, Take 100 mg by mouth., Disp: , Rfl:   •  insulin glargine (LANTUS) 100 UNIT/ML injection, Inject  under the skin., Disp: , Rfl:   •  vitamin D (ERGOCALCIFEROL) 79556 UNITS capsule capsule, Take 50,000 Units by mouth., Disp: , Rfl:      Social History   Social History            Social History   • Marital status:        Spouse name: N/A   • Number of children: N/A   • Years of education: N/A          Occupational History   • Not on file.           Social History Main Topics   • Smoking status: Former Smoker   • Smokeless tobacco: Never Used   • Alcohol use No   • Drug use: No   • Sexual activity: Not on file           Other Topics Concern   • Not on file      Social History Narrative                  Family History   Problem Relation Age of Onset   • No Known Problems Mother     • No Known Problems Father     • No Known Problems Sister     • No Known Problems Brother     • No Known Problems Maternal Grandmother     • No Known Problems Maternal Grandfather     • No Known Problems Paternal Grandmother     • No Known Problems Paternal Grandfather           The following portions of the patient's history were reviewed and updated as appropriate: allergies, current medications, past  family history, past medical history, past social history, past surgical history and problem list.         Vitals:     01/24/18 1144   BP: 150/94   Pulse: 106   Resp: 18   Temp: 98.7 °F (37.1 °C)         Physical Exam   Constitutional: He is oriented to person, place, and time. He appears well-nourished.   HENT:   Head: Normocephalic and atraumatic.   Mouth/Throat: Oropharynx is clear and moist.   Eyes: Conjunctivae and EOM are normal. Pupils are equal, round, and reactive to light. No scleral icterus.   Neck: Normal range of motion. Neck supple. No thyromegaly present.   Cardiovascular: Normal rate and regular rhythm.    Pulmonary/Chest: Effort normal and breath sounds normal.   Abdominal: Soft. Bowel sounds are normal. He exhibits no distension. There is no tenderness. There is no rebound and no guarding. No hernia.   Tenderness to palpation right upper quadrant at the port site   Musculoskeletal: Normal range of motion.   Lymphadenopathy:     He has no cervical adenopathy.   Neurological: He is alert and oriented to person, place, and time. No cranial nerve deficit. Coordination normal.   Skin: Skin is warm and dry. No erythema.   Psychiatric: He has a normal mood and affect. His behavior is normal.   Vitals reviewed.              Assessment:   Harsh Arias Jr. has severe obesity with multiple co-morbidities who would like to transfer his bariatric care to us and participate in our bariatric program.                Encounter Diagnoses   Name Primary?   • Other dysphagia Yes   • RUQ abdominal pain     • Gastroesophageal reflux disease, esophagitis presence not specified     • BMI 40.0-44.9, adult     • IDDM (insulin dependent diabetes mellitus)     • History of laparoscopic adjustable gastric banding              Discussion/Summary/Plan:      62-year-old gentleman status post realize band placement with chronic dysphagia heartburn port site pain who was referred from the [facility] to have the band removed.  We  will proceed with an upper GI for further evaluation to rule out of erosion or slip.The risks and benefits of the procedure were discussed with the patient in detail and all questions were answered.  Possibility of open, bleeding, infection, bowel injury, deep venous thrombosis, pulmonary embolism, incisional hernias, renal failure, myocardial infarction, respiratory and cardiac arrest and death were discussed. Consent will be signed and witnessed.     Recommended patient be sure to eat at least three meals per day all with high lean protein, vegetables and fruit. Be sure to limit/cut back on daily simple carbohydrate intake. Discussed with the patient the recommended amount of water per day to intake. Reviewed vitamin requirements. Be sure to do routine exercise including both cardio and strength training. Recommended patient to see our dietician to go into more detail regarding diet changes. Also discussed BMR testing.     Instructions / Recommendations: dietary counseling recommended, recommended a daily protein intake of  grams, vitamin supplements recommended, recommended exercising at least 150 minutes per week, behavior modifications recommended and instructed to call the office for concerns, questions, or problems.     The patient was instructed to follow up in after surgery     The patient was counseled regarding diet, exercise and the surgical procedures available. Our bariatric manual was given to the patient and was discussed in detail. Dietician appointment was highly recommended as well as attending support groups.  Total time of encounter was over 45 minutes and 40 minutes was spent counseling.

## 2018-03-14 NOTE — OP NOTE
Surgeon: Jacob Leiva Jr., M.D.    Assistant: JUAN Martinez RNFA    Pre-Operative Diagnosis: Chronic dysphagia status post adjustable gastric band    Post-Operative Diagnosis: 1. Same  2. Adhesions upper abdomen    Procedure Performed: Laparoscopic adjustable gastric band and port removal with lysis of adhesions    Anesthesia: GETA    Fluids: 1000 ml crystalloid    Specimens: Adjustable gastric band and port inspected then discarded    EBL: less than 10 ml    Fluids:  500 ml crystalloid    Complications: None    Surgery assisted and facilitated by a certified physician assistant, who directly resulted in a decreased operative time, anesthetic time, wound exposure, and possibly of an operative wound infection, thereby decreasing patient morbidity and ultimately total expenditures.     Indications:   Patient is status post adjustable gastric band placement with chronic dysphagia who now presents for elective removal. The risks and benefits of the procedure were discussed with the patient in detail and all questions were answered.  Possibility of open, bleeding, infection, bowel injury, deep venous thrombosis, pulmonary embolism, incisional hernias, renal failure, myocardial infarction, respiratory and cardiac arrest and death were discussed. Consent was signed and witnessed.    Procedure:   Patient was identified and after informed consent was obtained the patient was taken to the operating room and placed in the supine position. Patient was given preoperative antibiotic and SCDs were placed by the nursing staff.  After adequate general anesthesia the abdomen was prepped with choroprep and draped in the usual sterile fashion. The previous incisions were marked with indelible ink. An Ioban was placed. Using a 5 mm Visiport trocar and 5 mm 0 degree laparoscope the abdomen was entered without difficulty and a pneumoperitoneum was established with good opening pressures. General laparoscopic evaluation of the  abdominal cavity revealed no injury upon entry with the trocar.  A 10 mm trocar was placed at the port site incision under direct visualization and then 2  5 mm trochars were placed in the right upper quadrant and left upper quadrant under direct visualization.  The left lobe of the liver was elevated with a grasper. The adhesions overlying the buckle of the band were taken down with the electrocautery. The buckle was identified and opened up without difficulty. The adhesions overlying the band laterally were taken down with electrocautery. Careful attention was made not to injure the stomach. Sutures were removed with the scissors. The tubing of the band was cut with the scissors and the band was pulled from around the stomach without any difficulty. The lapband was taken out of the 10-11 trocar site. The stomach was inspected prior to removing the adjustable gastric band and no evidence of erosion was noted. There was a capsule noted at the previous adjustable gastric band site and this was opened up with the scissors and taken down. The band was inspected and no discoloration was noted. The gastric band was then discarded. The trocars were then removed under direct visualization. No bleeding was noted. The pneumoperitoneum was desufflated. The incisions were then infiltrated with 0.5% marcaine with epinephrine. A total of 60 ml was used throughout the case.  The incision at the port site was then carried down to the port with electrocautery. The port was Identified and the tubing was brouht up with a right angle clamp. Sutures were cut and removed. The port was removed without difficulty. The port was inspected and no abnormalities were seen. The incision was also infiltrated with the local anesthetic. The wound was irrigated with saline and dried. No bleeding was noted. The subcutaneous tissue was reapproximated with a 2-0 vicryl in an interrupted fashion and skin incisions closed with a 4-0 Monocryl in a  subcuticular fashion. The areas were then cleaned and dried and Dermabond was placed. The patient tolerated the procedure well and left operating room in good condition. Sponges and needles were counted and reported as correct.

## 2018-03-21 ENCOUNTER — OFFICE VISIT (OUTPATIENT)
Dept: BARIATRICS/WEIGHT MGMT | Facility: CLINIC | Age: 63
End: 2018-03-21

## 2018-03-21 VITALS
RESPIRATION RATE: 18 BRPM | DIASTOLIC BLOOD PRESSURE: 70 MMHG | HEART RATE: 93 BPM | HEIGHT: 67 IN | TEMPERATURE: 98.2 F | SYSTOLIC BLOOD PRESSURE: 108 MMHG | BODY MASS INDEX: 41.44 KG/M2 | WEIGHT: 264 LBS

## 2018-03-21 DIAGNOSIS — K21.9 GASTROESOPHAGEAL REFLUX DISEASE, ESOPHAGITIS PRESENCE NOT SPECIFIED: ICD-10-CM

## 2018-03-21 DIAGNOSIS — L76.82 INCISIONAL PAIN: ICD-10-CM

## 2018-03-21 DIAGNOSIS — R10.11 RUQ ABDOMINAL PAIN: ICD-10-CM

## 2018-03-21 PROBLEM — Z98.84 HISTORY OF REMOVAL OF LAPAROSCOPIC GASTRIC BANDING DEVICE: Status: ACTIVE | Noted: 2018-03-21

## 2018-03-21 PROBLEM — L24.A9 WOUND DRAINAGE: Status: ACTIVE | Noted: 2018-03-21

## 2018-03-21 PROCEDURE — 99024 POSTOP FOLLOW-UP VISIT: CPT | Performed by: NURSE PRACTITIONER

## 2018-03-21 NOTE — PROGRESS NOTES
[facility] BAR[facility]ATR[facility] BARIATRIC SURGERY  [address]  [facility] [id number] [phone]  [address]  [facility] [id number] [phone]  Dept: [phone]  3/21/2018      Harsh CAPRI Hugo Self.  [id number]  [id number]  1955  male      Chief Complaint   Patient presents with   • Follow-up     1 week postop AGB removal       BH Post-Op Bariatric Surgery:   Harsh FAROOQ Hugo Self is status post laparopscopic Laparoscopic Band Removal procedure, performed on 3/14/18.     HPI:   Today's weight is 120 kg (264 lb) pounds, today's BMI is Body mass index is 41.34 kg/m²., he has a  loss of 5 pounds since the last visit and his weight loss since surgery is 5 pounds. The patient reports a decreased portion size and loss of appetite.  Harsh Arias Jr. denies nausea, vomiting, dysphagia, or heartburn. The patient c/o post-op pain that is improving. he is doing well with protein and water intake so far. Taking their vitamins, walking and using IS. Denies fevers, chills, chest pain or shortness of air.     Reports that before band removal he was having dysphagia, spasms, and vomiting.      Diet and Exercise: Diet history reviewed and discussed with the patient. Weight loss/gains to date discussed with the patient. No carbonated beverage consumption and exercising regularly- walking frequently.   Supplements: multivitamins, B-12, calcium, iron, B-1 and Vitamin D.     Review of Systems   Constitutional: Positive for appetite change and fatigue. Negative for unexpected weight change.   HENT: Negative.    Eyes: Negative.    Respiratory: Negative.    Cardiovascular: Negative.  Negative for leg swelling.   Gastrointestinal: Positive for abdominal pain and constipation. Negative for abdominal distention, diarrhea, nausea and vomiting.   Genitourinary: Negative for difficulty urinating, frequency and urgency.   Musculoskeletal: Negative for back pain.   Skin: Positive for wound  (reports a large amount of drainage frmo largest would overnight).   Psychiatric/Behavioral: Negative.    All other systems reviewed and are negative.      Patient Active Problem List   Diagnosis   • BMI 40.0-44.9, adult   • IDDM (insulin dependent diabetes mellitus)   • Other dysphagia   • RUQ abdominal pain   • GERD (gastroesophageal reflux disease)   • History of laparoscopic adjustable gastric banding   • Dysphagia   • Wound drainage   • Incisional pain   • History of removal of laparoscopic gastric banding device       The following portions of the patient's history were reviewed and updated as appropriate: allergies, current medications, past family history, past medical history, past social history, past surgical history and problem list.    Vitals:    03/21/18 1042   BP: 108/70   Pulse: 93   Resp: 18   Temp: 98.2 °F (36.8 °C)       Physical Exam   Constitutional: He appears well-developed and well-nourished.   Neck: No thyromegaly present.   Cardiovascular: Normal rate, regular rhythm and normal heart sounds.    Pulmonary/Chest: Effort normal and breath sounds normal. No respiratory distress. He has no wheezes.   Abdominal: Soft. Bowel sounds are normal. He exhibits no distension. There is no tenderness. There is no guarding. No hernia.   Musculoskeletal: He exhibits no edema or tenderness.   Neurological: He is alert.   Skin: Skin is warm and dry. No rash noted. There is erythema.   Drainage from largest incision site, moderate amount, serosanguinous   Psychiatric: He has a normal mood and affect. His behavior is normal.   Nursing note and vitals reviewed.      Assessment:   Post-op, the patient is doing well.     Encounter Diagnoses   Name Primary?   • BMI 40.0-44.9, adult Yes   • Gastroesophageal reflux disease, esophagitis presence not specified    • RUQ abdominal pain    • Incisional pain        Plan:   Reviewed with patient the importance of following the manual for diet progression. Increase activity  as tolerated. Continue increasing daily intake of protein and water.   Return to work: the patient is to return to 3 weeks from their surgery date with no restrictions unless they develop medical problems in which we will see them back in the office. They received a note in our office today with their return to work date.  Activity restrictions: no lifting, pushing or pulling over 25lbs for 3 weeks.   Recommended patient be sure to get at least 70 grams of protein per day. Discussed with the patient the recommended amount of water per day to intake. Reviewed vitamin requirements. Be sure to do routine exercise and increase activity as tolerated. No asa, nsaids or steroids for 8 weeks. Patient may use miralax as needed if necessary.     Instructions / Recommendations: dietary counseling recommended, recommended a daily protein intake of  grams, vitamin supplement(s) recommended, recommended exercising at least 150 minutes per week, behavior modifications recommended and instructed to call the office for concerns, questions, or problems.     The patient was instructed to follow up at one month follow up appt.     The patient was counseled regarding post op bariatric manual

## 2018-04-18 ENCOUNTER — OFFICE VISIT (OUTPATIENT)
Dept: BARIATRICS/WEIGHT MGMT | Facility: CLINIC | Age: 63
End: 2018-04-18

## 2018-04-18 VITALS
DIASTOLIC BLOOD PRESSURE: 91 MMHG | TEMPERATURE: 98.6 F | HEART RATE: 101 BPM | BODY MASS INDEX: 42.22 KG/M2 | HEIGHT: 67 IN | WEIGHT: 269 LBS | RESPIRATION RATE: 18 BRPM | SYSTOLIC BLOOD PRESSURE: 144 MMHG

## 2018-04-18 DIAGNOSIS — L76.82 INCISIONAL PAIN: ICD-10-CM

## 2018-04-18 DIAGNOSIS — Z98.84 HISTORY OF REMOVAL OF LAPAROSCOPIC GASTRIC BANDING DEVICE: ICD-10-CM

## 2018-04-18 DIAGNOSIS — K21.9 GASTROESOPHAGEAL REFLUX DISEASE, ESOPHAGITIS PRESENCE NOT SPECIFIED: ICD-10-CM

## 2018-04-18 DIAGNOSIS — IMO0001 IDDM (INSULIN DEPENDENT DIABETES MELLITUS): ICD-10-CM

## 2018-04-18 PROBLEM — R10.11 RUQ ABDOMINAL PAIN: Status: RESOLVED | Noted: 2018-01-24 | Resolved: 2018-04-18

## 2018-04-18 PROBLEM — R13.10 DYSPHAGIA: Status: RESOLVED | Noted: 2018-02-19 | Resolved: 2018-04-18

## 2018-04-18 PROBLEM — R13.19 OTHER DYSPHAGIA: Status: RESOLVED | Noted: 2018-01-24 | Resolved: 2018-04-18

## 2018-04-18 PROBLEM — L24.A9 WOUND DRAINAGE: Status: RESOLVED | Noted: 2018-03-21 | Resolved: 2018-04-18

## 2018-04-18 PROCEDURE — 99024 POSTOP FOLLOW-UP VISIT: CPT | Performed by: NURSE PRACTITIONER

## 2018-04-18 NOTE — PROGRESS NOTES
[facility] BAR[facility]ATR[facility] BARIATRIC SURGERY  [address]  [facility] [address] [phone]  [address]  [facility] [id number] [phone]  Dept: [phone]  4/18/2018      Harsh E Hugo Self.  [id number]  [id number]  1955  male      Chief Complaint   Patient presents with   • Follow-up     1 month postop AGB removal       BH Post-Op Bariatric Surgery:   Harsh Arias JrJulieta is status post laparopscopic Laparoscopic Band Removal procedure, performed on 3/14/18.     HPI:   Today's weight is 122 kg (269 lb) pounds, today's BMI is Body mass index is 42.12 kg/m²., he has a gain of 5 pounds since the last visit The patient reports a decreased portion size and loss of appetite.  Harsh Arias Jr. denies nausea, vomiting, dysphagia, or heartburn. The patient c/o post-op pain that is improving. he is doing well with protein and water intake so far. Taking their vitamins, walking and using IS. Denies fevers, chills, chest pain or shortness of air.     Eating larger portions, more sweets, drinking soda. Walking and doing stretches 7 days per week and has increased water intake. Reports that he no longer has dysphagia, cramping.      Diet and Exercise: Diet history reviewed and discussed with the patient. Weight loss/gains to date discussed with the patient. No carbonated beverage consumption and exercising regularly- walking frequently.   Supplements: multivitamins, B-12, calcium, iron, B-1 and Vitamin D.     Review of Systems   Constitutional: Positive for appetite change. Negative for fatigue and unexpected weight change.   HENT: Negative.    Eyes: Negative.    Respiratory: Negative.    Cardiovascular: Negative.  Negative for leg swelling.   Gastrointestinal: Negative for abdominal distention, abdominal pain, constipation, diarrhea, nausea and vomiting.   Genitourinary: Negative for difficulty urinating, frequency and urgency.   Musculoskeletal: Negative for  back pain.   Skin: Negative.    Psychiatric/Behavioral: Negative.    All other systems reviewed and are negative.      Patient Active Problem List   Diagnosis   • BMI 40.0-44.9, adult   • IDDM (insulin dependent diabetes mellitus)   • GERD (gastroesophageal reflux disease)   • History of laparoscopic adjustable gastric banding   • History of removal of laparoscopic gastric banding device       The following portions of the patient's history were reviewed and updated as appropriate: allergies, current medications, past family history, past medical history, past social history, past surgical history and problem list.    Vitals:    04/18/18 0938   BP: 144/91   Pulse: 101   Resp: 18   Temp: 98.6 °F (37 °C)       Physical Exam   Constitutional: He appears well-developed and well-nourished.   Neck: No thyromegaly present.   Cardiovascular: Normal rate, regular rhythm and normal heart sounds.    Pulmonary/Chest: Effort normal and breath sounds normal. No respiratory distress. He has no wheezes.   Abdominal: Soft. Bowel sounds are normal. He exhibits no distension. There is no tenderness. There is no guarding. No hernia.   Musculoskeletal: He exhibits no edema or tenderness.   Neurological: He is alert.   Skin: Skin is warm and dry. No rash noted. No erythema.   Incisions closed without drainage and appear to be healing well.    Psychiatric: He has a normal mood and affect. His behavior is normal.   Nursing note and vitals reviewed.      Assessment:   Post-op, the patient is doing well.     Encounter Diagnoses   Name Primary?   • BMI 40.0-44.9, adult Yes   • History of removal of laparoscopic gastric banding device    • Incisional pain    • Gastroesophageal reflux disease, esophagitis presence not specified    • IDDM (insulin dependent diabetes mellitus)        Plan:   Reviewed with patient the importance of following the manual for diet progression. Increase activity as tolerated. Continue increasing daily intake of  protein and water.   Return to work: the patient is to return to 3 weeks from their surgery date with no restrictions unless they develop medical problems in which we will see them back in the office. They received a note in our office today with their return to work date.  Activity restrictions: no lifting, pushing or pulling over 25lbs for 3 weeks.   Recommended patient be sure to get at least 70 grams of protein per day. Discussed with the patient the recommended amount of water per day to intake. Reviewed vitamin requirements. Be sure to do routine exercise and increase activity as tolerated. No asa, nsaids or steroids for 8 weeks. Patient may use miralax as needed if necessary.     Instructions / Recommendations: dietary counseling recommended, recommended a daily protein intake of  grams, vitamin supplement(s) recommended, recommended exercising at least 150 minutes per week, behavior modifications recommended and instructed to call the office for concerns, questions, or problems.     The patient was instructed to follow up at one month follow up appt.     The patient was counseled regarding post op bariatric manual

## (undated) DEVICE — DISPOSABLE MONOPOLAR ENDOSCOPIC CORD 10 FT. (3M): Brand: KIRWAN

## (undated) DEVICE — ENDOPATH XCEL UNIVERSAL TROCAR STABLILITY SLEEVES: Brand: ENDOPATH XCEL

## (undated) DEVICE — 2, DISPOSABLE SUCTION/IRRIGATOR WITH DISPOSABLE TIP: Brand: STRYKEFLOW

## (undated) DEVICE — TROCAR: Brand: KII OPTICAL ACCESS SYSTEM

## (undated) DEVICE — ENDOPATH XCEL BLADELESS TROCARS WITH STABILITY SLEEVES: Brand: ENDOPATH XCEL

## (undated) DEVICE — MINI ENDOCUT SCISSOR TIP, DISPOSABLE: Brand: RENEW

## (undated) DEVICE — GLV SURG SENSICARE MICRO PF LF 8.5 STRL

## (undated) DEVICE — ADHS SKIN DERMABOND TOP ADVANCED

## (undated) DEVICE — ENDOPATH XCEL WITH OPTIVIEW TECHNOLOGY BLADELESS TROCARS WITH STABILITY SLEEVES: Brand: ENDOPATH XCEL OPTIVIEW

## (undated) DEVICE — SUT MNCRYL 4/0 PS2 18 IN

## (undated) DEVICE — SUT VIC 2/0 SH 27IN

## (undated) DEVICE — PAD GRND REM POLYHESIVE A/ DISP

## (undated) DEVICE — LAPAROSCOPIC GAS CONDITIONING DEVICE.: Brand: INSUFLOW

## (undated) DEVICE — GLV SURG SENSICARE GREEN W/ALOE PF LF 6 STRL

## (undated) DEVICE — PK BARIATRIC 10 40 70

## (undated) DEVICE — APPL CHLORAPREP W/TINT 26ML ORNG

## (undated) DEVICE — GLV SURG SENSICARE MICRO PF LF 6 STRL

## (undated) DEVICE — PENCL E/S HNDSWCH ROCKR CB

## (undated) DEVICE — COVER,LIGHT HANDLE,FLX,2/PK: Brand: MEDLINE INDUSTRIES, INC.

## (undated) DEVICE — GLV SURG SENSICARE GREEN W/ALOE PF LF 8.5 STRL